# Patient Record
Sex: FEMALE | Race: WHITE | NOT HISPANIC OR LATINO | URBAN - METROPOLITAN AREA
[De-identification: names, ages, dates, MRNs, and addresses within clinical notes are randomized per-mention and may not be internally consistent; named-entity substitution may affect disease eponyms.]

---

## 2023-08-06 ENCOUNTER — EMERGENCY (EMERGENCY)
Facility: HOSPITAL | Age: 63
LOS: 1 days | Discharge: ANOTHER HOSPITAL | End: 2023-08-06
Attending: EMERGENCY MEDICINE | Admitting: EMERGENCY MEDICINE
Payer: OTHER MISCELLANEOUS

## 2023-08-06 ENCOUNTER — EMERGENCY (EMERGENCY)
Facility: HOSPITAL | Age: 63
LOS: 1 days | Discharge: HOME | End: 2023-08-06
Attending: STUDENT IN AN ORGANIZED HEALTH CARE EDUCATION/TRAINING PROGRAM | Admitting: STUDENT IN AN ORGANIZED HEALTH CARE EDUCATION/TRAINING PROGRAM
Payer: OTHER MISCELLANEOUS

## 2023-08-06 VITALS
TEMPERATURE: 98 F | OXYGEN SATURATION: 97 % | DIASTOLIC BLOOD PRESSURE: 84 MMHG | SYSTOLIC BLOOD PRESSURE: 132 MMHG | HEART RATE: 71 BPM | RESPIRATION RATE: 18 BRPM

## 2023-08-06 VITALS
RESPIRATION RATE: 17 BRPM | HEART RATE: 87 BPM | WEIGHT: 199.96 LBS | DIASTOLIC BLOOD PRESSURE: 75 MMHG | OXYGEN SATURATION: 95 % | HEIGHT: 66 IN | SYSTOLIC BLOOD PRESSURE: 112 MMHG | TEMPERATURE: 98 F

## 2023-08-06 VITALS
OXYGEN SATURATION: 96 % | RESPIRATION RATE: 18 BRPM | DIASTOLIC BLOOD PRESSURE: 78 MMHG | SYSTOLIC BLOOD PRESSURE: 125 MMHG | HEART RATE: 93 BPM
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
## 2023-08-06 DIAGNOSIS — S82.842A DISPLACED BIMALLEOLAR FRACTURE OF LEFT LOWER LEG, INITIAL ENCOUNTER FOR CLOSED FRACTURE: ICD-10-CM
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
## 2023-08-06 PROCEDURE — 99285 EMERGENCY DEPT VISIT HI MDM: CPT

## 2023-08-06 PROCEDURE — 73610 X-RAY EXAM OF ANKLE: CPT | Mod: 26,LT

## 2023-08-06 PROCEDURE — 73110 X-RAY EXAM OF WRIST: CPT | Mod: 26,RT

## 2023-08-06 PROCEDURE — 73630 X-RAY EXAM OF FOOT: CPT | Mod: 26,LT

## 2023-08-06 PROCEDURE — 73610 X-RAY EXAM OF ANKLE: CPT | Mod: 26,59,LT

## 2023-08-06 RX ORDER — HYDROMORPHONE HYDROCHLORIDE 2 MG/ML
1 INJECTION INTRAMUSCULAR; INTRAVENOUS; SUBCUTANEOUS ONCE
Refills: 0 | Status: DISCONTINUED | OUTPATIENT
Start: 2023-08-06 | End: 2023-08-06

## 2023-08-06 RX ORDER — OXYCODONE AND ACETAMINOPHEN 5; 325 MG/1; MG/1
1 TABLET ORAL ONCE
Refills: 0 | Status: DISCONTINUED | OUTPATIENT
Start: 2023-08-06 | End: 2023-08-06

## 2023-08-06 RX ORDER — HYDROMORPHONE HYDROCHLORIDE 2 MG/ML
0.5 INJECTION INTRAMUSCULAR; INTRAVENOUS; SUBCUTANEOUS ONCE
Refills: 0 | Status: DISCONTINUED | OUTPATIENT
Start: 2023-08-06 | End: 2023-08-06

## 2023-08-06 RX ORDER — OXYCODONE HYDROCHLORIDE 5 MG/1
10 TABLET ORAL ONCE
Refills: 0 | Status: COMPLETED | OUTPATIENT
Start: 2023-08-06 | End: 2023-08-06

## 2023-08-06 RX ORDER — IBUPROFEN 200 MG
600 TABLET ORAL ONCE
Refills: 0 | Status: COMPLETED | OUTPATIENT
Start: 2023-08-06 | End: 2023-08-06

## 2023-08-06 RX ORDER — HYDROMORPHONE HYDROCHLORIDE 2 MG/ML
1 INJECTION INTRAMUSCULAR; INTRAVENOUS; SUBCUTANEOUS ONCE
Refills: 0 | Status: COMPLETED | OUTPATIENT
Start: 2023-08-06 | End: 2023-08-06

## 2023-08-06 RX ORDER — OXYCODONE HYDROCHLORIDE 5 MG/1
10 TABLET ORAL ONCE
Refills: 0 | Status: DISCONTINUED | OUTPATIENT
Start: 2023-08-06 | End: 2023-08-06

## 2023-08-06 RX ADMIN — OXYCODONE AND ACETAMINOPHEN 1 TABLET(S): 5; 325 TABLET ORAL at 18:32

## 2023-08-06 RX ADMIN — HYDROMORPHONE HYDROCHLORIDE 0.5 MILLIGRAM(S): 2 INJECTION INTRAMUSCULAR; INTRAVENOUS; SUBCUTANEOUS at 21:00

## 2023-08-06 RX ADMIN — Medication 600 MILLIGRAM(S): at 16:12

## 2023-08-06 NOTE — ED PROVIDER NOTE - PATIENT PORTAL LINK FT
You can access the FollowMyHealth Patient Portal offered by [facility] by registering at the following website: [email or web address]. By joining [employer]’s FollowMyHealth portal, you will also be able to view your health information using other applications (apps) compatible with our system.

## 2023-08-06 NOTE — ED PROVIDER NOTE - NS ED ATTENDING STATEMENT MOD
This was a shared visit with the TK. I reviewed and verified the documentation and independently performed the documented: home

## 2023-08-06 NOTE — ED PROVIDER NOTE - WR INTERPRETATION 1
Xray ankle complete: Trimal fracture with medial displacement of the distal fragements of the tibia and fibula.

## 2023-08-06 NOTE — ED ADULT NURSE NOTE - OBJECTIVE STATEMENT
Pt presents to ER c/o left ankle pain with swelling. Pt a transfer from [facility] for further evaluation by ortho., Pt A&O x3, calm and cooperative, airway patent, respirations regular, non-labored, lungs clear bilaterally to auscultation, abdomen soft non-tender, normoactive bowel sounds noted in all quadrants, strong palpable peripheral pulses noted, skin warm dry intact. Pt waiting ER provider evaluation.

## 2023-08-06 NOTE — ED ADULT TRIAGE NOTE - CHIEF COMPLAINT QUOTE
BIBEMS as a transfer from [facility] d/t L ankle fx, transferred for ortho consult. Tolerable pain level at this time.

## 2023-08-06 NOTE — ED ADULT NURSE NOTE - CHIEF COMPLAINT QUOTE
BIBEMS as a transfer from [facility] d/t L ankle fx, transferred for ortho consult. Tolerable pain level at this time.

## 2023-08-06 NOTE — ED ADULT NURSE NOTE - NSFALLRISKINTERV_ED_ALL_ED

## 2023-08-06 NOTE — ED PROVIDER NOTE - CARE PROVIDER_API CALL
Martín King  Orthopaedic Surgery  [address]  [place] [address]  Phone: [phone]  Fax: [phone]  Follow Up Time:

## 2023-08-06 NOTE — ED PROVIDER NOTE - OBJECTIVE STATEMENT
63 F denies pmh transferred from [facility] for L ankle fracture s/p trip and fall.  pt reports tripped and fall on 3 steps, twisted L ankle and caught herself w/ R hand- no head trauma or loc.  unable to bear wt after.  had xrays at [facility] showing + displaced ankle fx and instructed to transfer for reduction/splint. also reports mild pain in R distal ulna.   pt reports pain controlled s/p given percocet at [facility].  denies f/c, neck/back pain, chest pain, sob, nv, numbness, other injuries, use of AC

## 2023-08-06 NOTE — ED PROVIDER NOTE - PHYSICAL EXAMINATION
Vitals reviewed  Gen: well appearing, nad, speaking in full sentences  Skin: wwp, no rash/lesions  HEENT: ncat, eomi, mmm  Neck/back: no midline ttp/step off   CV: rrr, no audible m/r/g  Resp: symmetrical expansion, ctab, no w/r/r  LLE: + swelling/ecchymosis and deformity to ankle w/ diffuse overlying ttp, unable to range ankle, FROM hip/knee/toes, NO prox tib/fib ttp, SILT, cap refill < 2 sec, DP/PT Pulse 2+  RUE: minimal ttp over distal ulnar styloid, no deformity, FROM all joints, SILT, radial pulse 2+  Neuro: alert/oriented, no focal deficits

## 2023-08-06 NOTE — ED PROVIDER NOTE - OBJECTIVE STATEMENT
63-year-old female complaining of left ankle pain after falling down 2-3 steps.  She did not hit her head.  She broke her fall with her hands and complains of mild pain in her right wrist.  Her right upper chest hit a chair when she was falling.  She was unable to bear weight on the left ankle after standing up.

## 2023-08-06 NOTE — ED PROVIDER NOTE - NS ED MD DISPO DISCHARGE CCDA
Surgeon Contact Information    If you have questions or concerns related to your procedure please contact your surgeon through [facility] at [phone].  [facility]  Adult Discharge Orders & Instructions      For 24 hours after surgery:  1. Get plenty of rest.  A responsible adult must stay with you for at least 24 hours after you leave the hospital.   2. You may feel lightheaded.  IF so, sit for a few minutes before standing.  Have someone help you get up.   3. You may have a slight fever. Call the doctor if your fever is over 101 F (38.3 C) (taken under the tongue) or lasts longer than 24 hours.  4. You may have a dry mouth, a sore throat, muscle aches or trouble sleeping.  These should go away after 24 hours.  5. Do not make important or legal decisions.  6.   Do not drive or use heavy equipment.  If you have weakness or tingling, don't drive or use heavy equipment until this feeling goes away.                                                                                                                                                                           To contact a doctor, call    [phone]______________     Patient/Caregiver provided printed discharge information.

## 2023-08-06 NOTE — ED PROVIDER NOTE - PROGRESS NOTE DETAILS
post reduction/splint by ortho.  ct obtained per requesting.  R wrist xray no fx or dilcoation.   recommend marylou DURAND and levy f/u.  discussed strict return parameters

## 2023-08-06 NOTE — ED ADULT NURSE NOTE - NSFALLHARMRISKINTERV_ED_ALL_ED
Assistance OOB with selected safe patient handling equipment if applicable/Assistance with ambulation/Communicate risk of Fall with Harm to all staff, patient, and family/Monitor gait and stability/Provide visual cue: red socks, yellow wristband, yellow gown, etc/Reinforce activity limits and safety measures with patient and family/Bed in lowest position, wheels locked, appropriate side rails in place/Call bell, personal items and telephone in reach/Instruct patient to call for assistance before getting out of bed/chair/stretcher/Non-slip footwear applied when patient is off stretcher/[place] to call system/Physically safe environment - no spills, clutter or unnecessary equipment/Purposeful Proactive Rounding/Room/bathroom lighting operational, light cord in reach

## 2023-08-06 NOTE — ED PROVIDER NOTE - CLINICAL SUMMARY MEDICAL DECISION MAKING FREE TEXT BOX
63 F denies pmh transferred from [facility] for L ankle fracture s/p trip and fall.  no head trauma or loc.  also reports pain in L distal ulna but no xrays done.  ortho aware of pt and will reduce ankle/splint, plan for CT ankle after and will also obtain L wrist xray

## 2023-08-06 NOTE — ED PROVIDER NOTE - PHYSICAL EXAMINATION
VITAL SIGNS: I have reviewed nursing notes and confirm.  CONSTITUTIONAL: Well-developed; well-nourished; in no acute distress.  HEAD: Normocephalic; atraumatic.  EYES: EOM intact; conjunctiva and sclera clear.  ENT: nose appears normal  NECK: no midline c-spine tenderness  CARD: Strong DP pulse in the left foot  RESP: Breathing comfortably on RA.  ABD: soft; non-distended; non-tender  EXT: Significant swelling of the left ankle.  No tenderness as the base of the 5th metatarsal.  Minimal tenderness over the ulnar styloid of the right hand with no snuffbox tenderness, no swelling, and no bruising.    NEURO: no distal sensory deficits in the left foot  PSYCH: Cooperative, appropriate.

## 2023-08-06 NOTE — ED PROVIDER NOTE - NSFOLLOWUPINSTRUCTIONS_ED_ALL_ED_FT
Take tylenol 650mg or motrin 400-800mg as needed every 4-6 hours for pain.   REST- Rest your hurting/injured joint or extremity to decrease pain and swelling for 24-48 hours    ICE- Apply ice to area of pain to decreased inflammation and pain, put towel/barrier between ice and skin. You can keep ice on for 20 minutes at a time 4-8 times daily   COMPRESSION- Wear ace wrap or brace for support to reduce swelling.  Make sure not to wrap too tight, loosen if skin feeling numb/tingling or skin turns blue   ELEVATION- Elevate hurting/injured area 6 or more inches about level of heart to decrease swelling/inflammation.  Use pillow under joint to elevate area    Make sure to keep splint in place and use crutches to ambulate, do not bear any weight on left leg    Call to arrange follow up with orthopedic specialist within one week    You may call our referrals coordinator at [phone] Monday to Friday 11am-7pm for assistance with making an appointment Take tylenol 650mg or motrin 400-800mg as needed every 4-6 hours for pain.   You can take percocet for severe pain as prescribed   REST- Rest your hurting/injured joint or extremity to decrease pain and swelling for 24-48 hours    ICE- Apply ice to area of pain to decreased inflammation and pain, put towel/barrier between ice and skin. You can keep ice on for 20 minutes at a time 4-8 times daily   COMPRESSION- Wear ace wrap or brace for support to reduce swelling.  Make sure not to wrap too tight, loosen if skin feeling numb/tingling or skin turns blue   ELEVATION- Elevate hurting/injured area 6 or more inches about level of heart to decrease swelling/inflammation.  Use pillow under joint to elevate area    Make sure to keep splint in place and use crutches to ambulate, do not bear any weight on left leg    Call to arrange follow up with orthopedic specialist within one week    You may call our referrals coordinator at [phone] Monday to Friday 11am-7pm for assistance with making an appointment

## 2023-08-06 NOTE — ED PROVIDER NOTE - CLINICAL SUMMARY MEDICAL DECISION MAKING FREE TEXT BOX
63-year-old female presenting with left ankle pain and swelling after mechanical fall.  No evidence of other injuries on exam or by history.  X-ray reveals what appears to be a trimalleolar fracture of the left ankle.  Pain treated with a dose of ibuprofen.  Patient not requesting anything stronger at this time.  Discussed with the orthopedic attending on-call, Dr. Benito Rocha, who reviewed the x-rays and recommends the patient be transferred down to [facility] for a reduction and splinting.

## 2023-08-07 VITALS
RESPIRATION RATE: 18 BRPM | OXYGEN SATURATION: 97 % | HEART RATE: 82 BPM | SYSTOLIC BLOOD PRESSURE: 116 MMHG | DIASTOLIC BLOOD PRESSURE: 70 MMHG

## 2023-08-07 PROCEDURE — 73610 X-RAY EXAM OF ANKLE: CPT | Mod: 26,LT

## 2023-08-07 PROCEDURE — 99285 EMERGENCY DEPT VISIT HI MDM: CPT | Mod: 25

## 2023-08-07 PROCEDURE — 73110 X-RAY EXAM OF WRIST: CPT

## 2023-08-07 PROCEDURE — 73700 CT LOWER EXTREMITY W/O DYE: CPT | Mod: MA

## 2023-08-07 PROCEDURE — 73700 CT LOWER EXTREMITY W/O DYE: CPT | Mod: 26,LT,MA

## 2023-08-07 PROCEDURE — 96374 THER/PROPH/DIAG INJ IV PUSH: CPT | Mod: XU

## 2023-08-07 PROCEDURE — 73590 X-RAY EXAM OF LOWER LEG: CPT

## 2023-08-07 PROCEDURE — 73610 X-RAY EXAM OF ANKLE: CPT

## 2023-08-07 PROCEDURE — 27810 TREATMENT OF ANKLE FRACTURE: CPT | Mod: LT

## 2023-08-07 PROCEDURE — 73590 X-RAY EXAM OF LOWER LEG: CPT | Mod: 26,LT

## 2023-08-07 RX ORDER — OXYCODONE AND ACETAMINOPHEN 5; 325 MG/1; MG/1
1 TABLET ORAL
Qty: 12 | Refills: 0
Start: 2023-08-07

## 2023-08-07 NOTE — CONSULT NOTE ADULT - SUBJECTIVE AND OBJECTIVE BOX
HPI  63yFemale w/ c/o R ankle pain s/p mechanical fall. Unable to bear weight in the LLE since the fall involving 2-3 steps. Denies headstrike or LOC. Denies numbness/tingling in the LLE. Denies any other trauma/injuries at this time. At baseline, community ambulator w/o assistive devices.    ROS  Negative unless otherwise specified in HPI.    PAST MEDICAL & SURGICAL Hx  PAST MEDICAL & SURGICAL HISTORY:  No pertinent past medical history      No significant past surgical history          MEDICATIONS  Home Medications:      ALLERGIES  No Known Allergies      FAMILY Hx  FAMILY HISTORY:      SOCIAL Hx  Social History:      VITALS  Vital Signs Last 24 Hrs  T(C): 36.9 (06 Aug 2023 19:44), Max: 36.9 (06 Aug 2023 15:32)  T(F): 98.4 (06 Aug 2023 19:44), Max: 98.4 (06 Aug 2023 15:32)  HR: 82 (07 Aug 2023 02:00) (71 - 93)  BP: 116/70 (07 Aug 2023 02:00) (112/75 - 132/84)  BP(mean): --  RR: 18 (07 Aug 2023 02:00) (17 - 20)  SpO2: 97% (07 Aug 2023 02:00) (95% - 99%)    Parameters below as of 06 Aug 2023 19:44  Patient On (Oxygen Delivery Method): room air        PHYSICAL EXAM  Gen: Lying in bed, NAD  Resp: No increased WOB  LLE:  Skin intact, +edema and +ecchymosis over L ankle  +TTP over L ankle, no TTP along remainder of extremity; compartments soft  Limited ROM at ankle 2/2 pain  Motor: TA/EHL/GS/FHL intact  Sensory: DP/SP/Tib/Rukhsana/Saph SILT  +DP pulse, WWP    Secondary survey:  No TTP along spine or other extremities, pelvis grossly stable, SILT and compartments soft throughout    LABS              IMAGING  XRs: L pilon fx (personal read)    PROCEDURE  Closed reduction was performed and a well-padded, well-molded plaster splint applied. The patient tolerated the procedure well without evidence of complications. The patient was neurovascularly intact following reduction. Post-reduction XRs demonstrated acceptable alignment. The patient was informed about splint precautions (keep dry, do not stick anything inside, monitor for signs/symptoms of increased compartmental pressure: uncontrolled pain, worsening numbness/tingling, severe pain with movement of the toes) and verbalized understanding.    ASSESSMENT & PLAN  63yFemale w/ L pilon fx s/p closed reduction and immobilization.  -NWB LLE in a long-leg AO splint and crutches  -splint precautions  -pain control  -ice/cold compress, elevation  -Follow with Benito Morales office outpatient  -Elevation

## 2023-08-10 DIAGNOSIS — S82.852A DISPLACED TRIMALLEOLAR FRACTURE OF LEFT LOWER LEG, INITIAL ENCOUNTER FOR CLOSED FRACTURE: ICD-10-CM
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
## 2023-08-10 DIAGNOSIS — M25.572 PAIN IN LEFT ANKLE AND JOINTS OF LEFT FOOT: ICD-10-CM
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
## 2023-08-10 DIAGNOSIS — M25.531 PAIN IN RIGHT WRIST: ICD-10-CM

## 2024-02-12 ENCOUNTER — APPOINTMENT (OUTPATIENT)
Dept: ORTHOPEDIC SURGERY | Facility: CLINIC | Age: 64
End: 2024-02-12
Payer: OTHER MISCELLANEOUS

## 2024-02-12 PROBLEM — Z78.9 OTHER SPECIFIED HEALTH STATUS: Chronic | Status: ACTIVE | Noted: 2023-08-06

## 2024-02-12 PROBLEM — Z00.00 ENCOUNTER FOR PREVENTIVE HEALTH EXAMINATION: Status: ACTIVE | Noted: 2024-02-12

## 2024-02-12 PROCEDURE — 73110 X-RAY EXAM OF WRIST: CPT | Mod: 50

## 2024-02-12 PROCEDURE — 99203 OFFICE O/P NEW LOW 30 MIN: CPT

## 2024-02-12 NOTE — ASSESSMENT
[FreeTextEntry1] : 6-month status post fall on right wrist at work which is resulted in pain of the right wrist which is not resolved over the past 6 months despite rest activity modifications over time.  Patient states symptoms are gradually increasing with use.  The risks, benefits, alternatives and associated differential diagnosis was discussed with the patient. Options ranged from conservative care, therapy to surgical intervention were reviewed and all questions answered. Risks included incomplete resolution of symptoms, worsening of symptoms recurrence, tissue loss, functional loss and other risks associated with treatment of this condition Patient appeared to have an excellent understanding of the risks as well as differential diagnosis associated with this condition.  Elected to proceed with splinting as well as an MRI to help with the differential diagnosis.  Based on the response to the splint and the MRI a definitive plan will be developed.  Patient presently not working due to her lower ankle injury.

## 2024-02-12 NOTE — PHYSICAL EXAM
[de-identified] : Physical exam shows the patient to be alert and oriented x3, capable of ambulation. The patient is well-developed and well-nourished in no apparent respiratory distress. Majority of the skin is intact bilaterally in the upper extremities without lymphadenopathy at the elbows.  There is no swelling or masses palpable but there is tenderness over the scapholunate ligament interval of the right wrist with pain upon forced flexion and extension but negative Porter test.  There is also discomfort upon forced pronation and supination without instability radial ulnar joint and mild tenderness over the TFCC.  No tenderness over the pisotriquetral joint.  No tenderness over the scaphoid, lunate or triquetrum. No tenderness over the CMC negative grind test no tenderness over any CMC's symmetric bilaterally No tenderness over the flexor or extensor tendons which are intact including the EPL. [de-identified] : PA lateral and oblique of both wrist shows mild degenerative changes and sclerosis of the thumb CMC as well as STT joints bilaterally suggestive of osteoarthritis.  No evidence of fractures or dislocations noted.

## 2024-02-12 NOTE — HISTORY OF PRESENT ILLNESS
[Has the patient missed work because of the injury/illness?] : The patient has missed work because of the injury/illness. [No] : The patient is currently not working. [FreeTextEntry1] : DOI-8/6/23 Patient presents with 6 months status post right wrist injury sustained from a fall while going up temporary steps. Patient is a  and stepped down on a step and lost her footing.  She complains of right dorsal wrist pain  [FreeTextEntry2] :  [FreeTextEntry3] : Twisting motions, writing and lifting. [FreeTextEntry5] : No [FreeTextEntry6] : 8/22/23

## 2024-05-01 ENCOUNTER — APPOINTMENT (OUTPATIENT)
Dept: ORTHOPEDIC SURGERY | Facility: CLINIC | Age: 64
End: 2024-05-01
Payer: OTHER MISCELLANEOUS

## 2024-05-01 DIAGNOSIS — S69.91XA UNSPECIFIED INJURY OF RIGHT WRIST, HAND AND FINGER(S), INITIAL ENCOUNTER: ICD-10-CM

## 2024-05-01 PROCEDURE — 99214 OFFICE O/P EST MOD 30 MIN: CPT | Mod: 25

## 2024-05-01 NOTE — PHYSICAL EXAM
[de-identified] : There is tenderness over the radial joint of the right wrist and TFCC without evidence of instability.  No tenderness over the scaphoid scapholunate Alluna Treacher ligament. Pronation/supination 80/80 symmetric bilaterally Flexion/extension 80/60 symmetric bilaterally no evidence of joint or tendon instability no tenderness over the ECU pisotriquetral lunotriquetral or fourth or fifth CMC joint.   strength 60 pounds bilaterally Right-hand-dominant  There is 2+ pulses of the radial arteries bilaterally. Jp's test shows excellent flow through the radial and ulnar arteries with an intact arch bilaterally.  There is good capillary refill of the digits bilaterally.There is no masses or sensitivity over the median and ulnar nerves at the level of the wrist. There is a negative Tinel's and negative Phalen's sign bilaterally. The sensation is grossly intact bilaterally.

## 2024-05-01 NOTE — ASSESSMENT
[FreeTextEntry1] : Patient's symptoms appear to be localizing to the distal radial ulnar joint.  Although the TFCC is intact on MRI it does show some fluid within the DRUJ.  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  Options were discussed ranging from conservative management, therapy, splinting, or injection.  Risk associate with each option discussed and all questions answered.  Since symptoms are gradually improving over time she elected conservative management.  If symptoms do not resolve she may consider more aggressive forms of treatment.  Return to the office in 6 weeks if symptoms or not fully resolved otherwise on an as-needed basis.  She can continue to function full duty without restriction

## 2024-05-01 NOTE — HISTORY OF PRESENT ILLNESS
[FreeTextEntry1] : DOI: 08/06/2023 8 months status post right wrist injury sustained from a fall while going up temporary steps. Patient is here for review of MRI demonstrating  1.  No TFCC tear 2.  No scapholunate ligament 3.  Marrow edema or chondromalacia of the hamate base 4.  Mild FCR tenosynovitis, without tendinosis or tear 5.  Minimal fluid signal in the DRUJ and midcarpal compartment

## 2025-04-26 ENCOUNTER — TRANSCRIPTION ENCOUNTER (OUTPATIENT)
Age: 65
End: 2025-04-26

## 2025-09-14 ENCOUNTER — NON-APPOINTMENT (OUTPATIENT)
Age: 65
End: 2025-09-14